# Patient Record
(demographics unavailable — no encounter records)

---

## 2018-04-01 NOTE — EDPHY
HPI/HX/ROS/PE/MDM


Narrative: 





CHIEF COMPLAINT: Cough





HPI: 





This patient is a Prydein[place]-speaking 55 year old female complaining of cough. The 

cough has been persistent for about one week. She went to urgent care on Monday 

and was prescribed Azithromycin for bronchitis. She has noted a productive 

cough with occasional yellow sputum. Additionally, she has had night sweats. Now

, has back pain associated with her cough. She denies having a chest x-ray at 

urgent care. The patient is also taking a cough suppressant at night and one 

additional medication, but she cannot remember the names of these medications. 

She denies vomiting, diarrhea, rash, urinary complaints, or other associated 

symptoms. 





HPI obtained primarily thought  at bedside. 





REVIEW OF SYSTEMS:


Aside from elements discussed in the HPI, a comprehensive 10-point review of 

systems was reviewed and is negative.





PMH: Diabetes. 





SOCIAL HISTORY: Prydeinig-speaking. Friend at bedside. 





PHYSICAL EXAM:


General:Patient is alert, in no acute distress.


ENT:Eyes are normal to inspection.  ENT inspection normal.


Neck: Normal inspection.  Full range of motion.


Respiratory:No respiratory distress.  Breath sounds normal bilaterally.


Cardiovascular: Regular rate and rhythm.  Strong peripheral pulses.  Normal cap 

refill.


Abdomen:The abdomen is nontender to palpation. There are no peritoneal signs. 

There are normal bowel sounds.


Back: Normal to inspection.  No tenderness to palpation.


Skin: Normal color.  No rash.  Warm and dry.


Extremities: Normal appearance.  Full range of motion.


Neuro: Oriented x3.  Normal motor function.  Normal sensory function.





ED Course: 





56 y/o female presents with one week history of cough. She is currently taking 

Azithromycin and a cough suppressant, but symptoms persist. Her lungs are clear 

to auscultation on exam. Plan for chest x-ray.  





Chest x-ray negative for pneumonia. 





Plan to discharge patient home in good condition. She informs me she has 

already completed her course of Azithromycin but is still taking cough 

medications. She will continue to take these and follow up with her primary 

care provider. Return precautions discussed. She is comfortable with this plan. 





- Data Points


Imaging Results: 


 Imaging Impressions





Chest X-Ray  04/01/18 11:15


Impression:  Findings most consistent with airways disease are noted.











Imaging: I viewed and interpreted images myself





General


Initial Vital Signs: 


 Initial Vital Signs











Temperature (C)  36.7 C   04/01/18 10:54


 


Heart Rate  73   04/01/18 10:54


 


Respiratory Rate  20   04/01/18 10:54


 


Blood Pressure  164/90 H  04/01/18 10:54


 


O2 Sat (%)  95   04/01/18 10:54








 











O2 Delivery Mode               Room Air














Allergies/Adverse Reactions: 


 





No Known Allergies Allergy (Verified 04/01/18 10:54)


 








Home Medications: 














 Medication  Instructions  Recorded


 


Azithromycin  04/01/18


 


Diabetic Pill   04/01/18














Departure





- Departure


Disposition: Home, Routine, Self-Care


Clinical Impression: 


Acute bronchitis


Qualifiers:


 Bronchitis organism: other organism Qualified Code(s): J20.8 - Acute 

bronchitis due to other specified organisms





Condition: Good


Instructions:  Acute Bronchitis (ED)


Additional Instructions: 


1. Follow up with your primary care provider in 2-3 days. 





2. Continue taking your cough medications as prescribed. 





3. Return to the emergency department for fever, chest pain, difficulty 

breathing, or other worsening of condition.  





1. Seguimiento con el provedor de cuidado primario en 2-3 holguin.





2. Continue tomando carlos enrique medicamentos bruno se le recetaron.





3.Regrese al departamento de emergencias si tiene fiebre, dolor de pecho, 

dificultad al respirar, o empeoramiento de la condicion. 


Referrals: 


Yang Lala MD [Primary Care Provider] - As per Instructions


Print Language: Prydeinig


Report Scribed for: Eugenio Paiz


Report Scribed by: Mishel Amaral


Date of Report: 04/01/18


Time of Report: 11:15


Physician Review and Approval Statement: Portions of this note were transcribed 

by an ED scribe.  I personally performed the history, physical exam, and 

medical decision making; and confirm the accuracy of the information in the 

transcribed note.

## 2018-12-23 NOTE — EDPHY
H & P


Stated Complaint: Right flank pain and right mid abdominal pain x 1 week


Time Seen by Provider: 12/23/18 23:29


HPI/ROS: 





HPI





CHIEF COMPLAINT:  Right-sided abdominal pain.





HISTORY OF PRESENT ILLNESS:  56-year-old female, predominantly Scottish-speaking

, has family members at bedside that were used for  daughter at 

bedside.  She has a history of diabetes.  She presents emergency room with 1 

week of abdominal pain it is mainly right-sided.  Right mid abdomen.  She 

denies any chest pain or shortness of breath denies pleuritic pain.  Pain is 

located right-sided abdomen right flank.  Does not really radiate anywhere.  No 

associated nausea vomiting or diarrhea.  Rather constant pain.  Denies it being 

worse after she eats.





Past Medical History:  Diabetes





Past Surgical History:  Denies





Social History:  Denies drugs alcohol tobacco.





Family History:  Noncontributory








ROS   


REVIEW OF SYSTEMS:


10 Systems were reviewed and negative with the exception of the elements 

mentioned in the history of present illness.








Exam   


Constitutional   appears well nontoxic no acute distress triage nursing summary 

reviewed, vital signs reviewed, awake/alert. 


Eyes   normal conjunctivae and sclera, EOMI, PERRLA. 


HENT   normal inspection, atraumatic, moist mucus membranes, no epistaxis, neck 

supple/ no meningismus, no raccoon eyes. 


Respiratory   clear to auscultation bilaterally, normal breath sounds, no 

respiratory distress, no wheezing. 


Cardiovascular   rate normal, regular rhythm, no murmur, no edema, distal 

pulses normal. 


Gastrointestinal   mild tender palpation right flank and right-sided abdomen, 

no rebound, no guarding, normal bowel sounds, no distension, no pulsatile mass. 


Genitourinary   no CVA tenderness. 


Musculoskeletal  no midline vertebral tenderness, full range of motion, no calf 

swelling, no tenderness of extremities, no meningismus, good pulses, 

neurovascularly intact.


Skin   pink, warm, & dry, no rash, skin atraumatic. 


Neurologic   awake, alert and oriented x 3, AAOx3, moves all 4 extremities 

equally, motor intact, sensory intact, CN II-XII intact, normal cerebellar, 

normal vision, normal speech. 


Psychiatric   normal mood/affect. 


Heme/Lymph/Immune   no lymphadenopathy.





Differential diagnosis includes but is not limited to and in no particular order

:  Bowel obstruction, appendicitis, gallbladder disease, diverticulitis, colitis

, enteritis, perforated viscus, gastritis, GERD, esophagitis, urinary tract 

infection, pyelonephritis, kidney stones





Medical Decision Making:  Plan for this patient IV establishment IV fluid bolus

, IV Dilaudid 0.5 mg for pain control IV Zofran for nausea, basic blood work, 

CT scan abdomen pelvis with IV contrast.





Re-evaluation:








CT scan abdomen pelvis with IV contrast shows a normal appendix.  Constipation 

present.  Gallstone in the gallbladder without any signs of acute 

cholecystitis.  Normal right kidney.  Called to me by Dr. Oppenheimer





0114:  Re-evaluation at this time resting comfortably no acute distress abdomen 

soft nontender.  No further abdominal pain.  And urinalysis reviewed shows UTI.

  Keflex prescription provided.  1 g Rocephin given in emergency room.





As for right-sided abdominal pain it could be due to her urinary tract 

infection and early kidney infection however also she additionally has a 

gallstone seen on her CT scan with no evidence of acute cholecystitis.





I discussed this at length with Donna HDZ as .





I do recommend she refrain self spicy fatty greasy foods.





Return precautions discussed with the patient worsening abdominal pain fever 

vomiting





Keflex prescribed for UTI.





Refrain from fatty greasy foods.





Recommend she lose weight.





She is comfortable this plan.





Extensive discussion with the patient at bedside along with Donna HDZ.  All the 

patient's questions were answered.  I do recommend she refrain from fatty 

greasy foods.  Follow up with her primary care doctor Christina for UTI.


Source: Patient





- Personal History


Current Tetanus Diphtheria and Acellular Pertussis (TDAP): No





- Medical/Surgical History


Hx Asthma: No


Hx Chronic Respiratory Disease: No


Hx Diabetes: Yes


Hx Cardiac Disease: No


Hx Renal Disease: No


Hx Cirrhosis: No


Hx Alcoholism: No


Hx HIV/AIDS: No


Hx Splenectomy or Spleen Trauma: No


Other PMH: DM





- Social History


Smoking Status: Never smoked


Constitutional: 


 Initial Vital Signs











Temperature (C)  36.5 C   12/23/18 23:21


 


Heart Rate  78   12/23/18 23:21


 


Respiratory Rate  16   12/23/18 23:21


 


Blood Pressure  168/91 H  12/23/18 23:21


 


O2 Sat (%)  95   12/23/18 23:21








 











O2 Delivery Mode               Nasal Cannula


 


O2 (L/minute)                  2














Allergies/Adverse Reactions: 


 





No Known Allergies Allergy (Verified 04/01/18 10:54)


 








Home Medications: 














 Medication  Instructions  Recorded


 


Metformin 1000 mg  12/23/18


 


Cephalexin [Keflex] 500 mg PO Q6H #28 cap 12/24/18














Medical Decision Making





- Data Points


Laboratory Results: 


 Laboratory Results





 12/23/18 23:46 





 12/23/18 23:46 





 











  12/24/18 12/23/18 12/23/18





  00:02 23:46 23:46


 


WBC      7.80 10^3/uL 10^3/uL





     (3.80-9.50) 


 


RBC      5.03 10^6/uL 10^6/uL





     (4.18-5.33) 


 


Hgb      14.5 g/dL g/dL





     (12.6-16.3) 


 


Hct      43.8 % %





     (38.0-47.0) 


 


MCV      87.1 fL fL





     (81.5-99.8) 


 


MCH      28.8 pg pg





     (27.9-34.1) 


 


MCHC      33.1 g/dL g/dL





     (32.4-36.7) 


 


RDW      13.7 % %





     (11.5-15.2) 


 


Plt Count      192 10^3/uL 10^3/uL





     (150-400) 


 


MPV      9.8 fL fL





     (8.7-11.7) 


 


Neut % (Auto)      51.1 % %





     (39.3-74.2) 


 


Lymph % (Auto)      37.7 % %





     (15.0-45.0) 


 


Mono % (Auto)      9.0 % %





     (4.5-13.0) 


 


Eos % (Auto)      1.7 % %





     (0.6-7.6) 


 


Baso % (Auto)      0.4 % %





     (0.3-1.7) 


 


Nucleat RBC Rel Count      0.0 % %





     (0.0-0.2) 


 


Absolute Neuts (auto)      3.99 10^3/uL 10^3/uL





     (1.70-6.50) 


 


Absolute Lymphs (auto)      2.94 10^3/uL 10^3/uL





     (1.00-3.00) 


 


Absolute Monos (auto)      0.70 10^3/uL 10^3/uL





     (0.30-0.80) 


 


Absolute Eos (auto)      0.13 10^3/uL 10^3/uL





     (0.03-0.40) 


 


Absolute Basos (auto)      0.03 10^3/uL 10^3/uL





     (0.02-0.10) 


 


Absolute Nucleated RBC      0.00 10^3/uL 10^3/uL





     (0-0.01) 


 


Immature Gran %      0.1 % %





     (0.0-1.1) 


 


Immature Gran #      0.01 10^3/uL 10^3/uL





     (0.00-0.10) 


 


Sodium    142 mEq/L mEq/L  





    (135-145)  


 


Potassium    3.9 mEq/L mEq/L  





    (3.5-5.2)  


 


Chloride    111 mEq/L H mEq/L  





    ()  


 


Carbon Dioxide    23 mEq/l mEq/l  





    (22-31)  


 


Anion Gap    8 mEq/L mEq/L  





    (6-14)  


 


BUN    19 mg/dL mg/dL  





    (7-23)  


 


Creatinine    0.6 mg/dL mg/dL  





    (0.6-1.0)  


 


Estimated GFR    > 60   





    


 


Glucose    105 mg/dL H mg/dL  





    ()  


 


Calcium    9.3 mg/dL mg/dL  





    (8.5-10.4)  


 


Total Bilirubin    0.4 mg/dL mg/dL  





    (0.1-1.4)  


 


Conjugated Bilirubin    0.2 mg/dL mg/dL  





    (0.0-0.5)  


 


Unconjugated Bilirubin    0.2 mg/dL mg/dL  





    (0.0-1.1)  


 


AST    32 IU/L IU/L  





    (14-46)  


 


ALT    33 IU/L IU/L  





    (9-52)  


 


Alkaline Phosphatase    129 IU/L H IU/L  





    ()  


 


Total Protein    7.6 g/dL g/dL  





    (6.3-8.2)  


 


Albumin    4.6 g/dL g/dL  





    (3.5-5.0)  


 


Lipase    151 IU/L IU/L  





    ()  


 


Urine Color  YELLOW     





    


 


Urine Appearance  HAZY     





    


 


Urine pH  5.0     





   (5.0-7.5)   


 


Ur Specific Gravity  > 1.035  H     





   (1.002-1.030)   


 


Urine Protein  NEGATIVE     





   (NEGATIVE)   


 


Urine Ketones  NEGATIVE     





   (NEGATIVE)   


 


Urine Blood  NEGATIVE     





   (NEGATIVE)   


 


Urine Nitrate  NEGATIVE     





   (NEGATIVE)   


 


Urine Bilirubin  NEGATIVE     





   (NEGATIVE)   


 


Urine Urobilinogen  NEGATIVE EU EU    





   (0.2-1.0)   


 


Ur Leukocyte Esterase  2+  H     





   (NEGATIVE)   


 


Urine RBC  1-3 /hpf /hpf    





   (0-3)   


 


Urine WBC  15-25 /hpf H /hpf    





   (0-3)   


 


Ur Epithelial Cells  TRACE /lpf /lpf    





   (NONE-1+)   


 


Urine Mucus  TRACE /lpf /lpf    





   (NONE-1+)   


 


Urine Glucose  NEGATIVE     





   (NEGATIVE)   











Medications Given: 


 








Discontinued Medications





Hydromorphone HCl (Dilaudid)  0.5 mg IVP EDNOW ONE


   Stop: 12/23/18 23:39


   Last Admin: 12/23/18 23:55 Dose:  0.5 mg


Sodium Chloride (Ns)  1,000 mls @ 0 mls/hr IV EDNOW ONE; Wide Open


   PRN Reason: Protocol


   Stop: 12/23/18 23:30


   Last Admin: 12/23/18 23:43 Dose:  1,000 mls


Ceftriaxone Sodium/Dextrose (Rocephin 1 Gm (Premix))  50 mls @ 100 mls/hr IV 

EDNOW ONE


   PRN Reason: Protocol


   Stop: 12/24/18 00:50


   Last Admin: 12/24/18 00:25 Dose:  50 mls


Ondansetron HCl (Zofran)  4 mg IVP EDNOW ONE


   Stop: 12/23/18 23:39


   Last Admin: 12/23/18 23:55 Dose:  4 mg








Departure





- Departure


Disposition: Home, Routine, Self-Care


Clinical Impression: 


Abdominal pain


Qualifiers:


 Abdominal location: right upper quadrant Qualified Code(s): R10.11 - Right 

upper quadrant pain





Urinary tract infection


Qualifiers:


 Urinary tract infection type: acute cystitis Hematuria presence: with 

hematuria Qualified Code(s): N30.01 - Acute cystitis with hematuria





Gallstone


Qualifiers:


 Cholecystitis presence: without cholecystitis Biliary obstruction: without 

biliary obstruction Qualified Code(s): K80.20 - Calculus of gallbladder without 

cholecystitis without obstruction





Condition: Good


Instructions:  Cephalexin (By mouth), Gallstones (ED), Urinary Tract Infection 

in Women (ED)


Additional Instructions: 


1. Follow up with your primary care doctor


2. Return to the emergency room if he develops worsening abdominal pain, fever, 

vomiting


3. Infection was seen in urine given antibiotic





Referrals: 


Yang Lala MD [Primary Care Provider] - As per Instructions


Prescriptions: 


Cephalexin [Keflex] 500 mg PO Q6H #28 cap


Print Language: Scottish